# Patient Record
Sex: FEMALE | Race: WHITE | NOT HISPANIC OR LATINO | Employment: OTHER | URBAN - METROPOLITAN AREA
[De-identification: names, ages, dates, MRNs, and addresses within clinical notes are randomized per-mention and may not be internally consistent; named-entity substitution may affect disease eponyms.]

---

## 2021-07-29 ENCOUNTER — VBI (OUTPATIENT)
Dept: ADMINISTRATIVE | Facility: OTHER | Age: 61
End: 2021-07-29

## 2021-07-29 NOTE — TELEPHONE ENCOUNTER
Upon review of the In Basket request we were able to locate, review, and update the patient chart as requested for DEXA Scan, Immunization(s) Influenza, Mammogram and Pap Smear (HPV) aka Cervical Cancer Screening  Any additional questions or concerns should be emailed to the Practice Liaisons via [email or web address] email, please do not reply via In Basket      Thank you  Jarrod Gillis

## 2021-09-15 PROBLEM — Z80.3 FAMILY HISTORY OF MALIGNANT NEOPLASM OF BREAST: Status: ACTIVE | Noted: 2021-09-15

## 2021-09-16 RX ORDER — AMLODIPINE BESYLATE 5 MG/1
1 TABLET ORAL DAILY
COMMUNITY
Start: 2021-09-03

## 2021-09-16 RX ORDER — AMOXICILLIN 500 MG
1 CAPSULE ORAL EVERY 24 HOURS
COMMUNITY

## 2021-09-16 RX ORDER — SPIRONOLACTONE 50 MG/1
1 TABLET, FILM COATED ORAL DAILY
COMMUNITY
Start: 2021-09-02

## 2021-09-16 RX ORDER — ATORVASTATIN CALCIUM 10 MG/1
1 TABLET, FILM COATED ORAL DAILY
COMMUNITY
Start: 2021-09-14

## 2021-09-16 RX ORDER — ASPIRIN 81 MG/1
1 TABLET ORAL EVERY 24 HOURS
COMMUNITY
End: 2022-03-18 | Stop reason: ALTCHOICE

## 2021-09-16 RX ORDER — CYANOCOBALAMIN (VITAMIN B-12) 500 MCG
1 LOZENGE ORAL EVERY 24 HOURS
COMMUNITY

## 2021-09-16 RX ORDER — METOPROLOL SUCCINATE 25 MG/1
1 TABLET, EXTENDED RELEASE ORAL DAILY
COMMUNITY

## 2021-09-16 RX ORDER — APIXABAN 5 MG/1
1 TABLET, FILM COATED ORAL 2 TIMES DAILY
COMMUNITY
Start: 2021-09-02

## 2021-09-17 ENCOUNTER — OFFICE VISIT (OUTPATIENT)
Dept: OBGYN CLINIC | Facility: CLINIC | Age: 61
End: 2021-09-17
Payer: COMMERCIAL

## 2021-09-17 VITALS
SYSTOLIC BLOOD PRESSURE: 130 MMHG | DIASTOLIC BLOOD PRESSURE: 70 MMHG | HEIGHT: 64 IN | WEIGHT: 138 LBS | BODY MASS INDEX: 23.56 KG/M2

## 2021-09-17 DIAGNOSIS — Z80.3 FAMILY HISTORY OF MALIGNANT NEOPLASM OF BREAST: Primary | ICD-10-CM

## 2021-09-17 PROCEDURE — 99213 OFFICE O/P EST LOW 20 MIN: CPT | Performed by: OBSTETRICS & GYNECOLOGY

## 2021-09-17 NOTE — PROGRESS NOTES
Assessment/Plan:    Family history of malignant neoplasm of breast - Sister @ 62, negative genetics  Is being followed by neurology, now on Eliquis  No changes in SBE/CBE, mammogram normal 1/2021  Return to office six months for wellness       Diagnoses and all orders for this visit:    Family history of malignant neoplasm of breast - Sister @ 62, negative genetics    Other orders  -     aspirin (ECOTRIN LOW STRENGTH) 81 mg EC tablet; Take 1 tablet by mouth every 24 hours (Patient not taking: Reported on 9/17/2021)  -     spironolactone (ALDACTONE) 50 mg tablet; Take 1 tablet by mouth daily  -     atorvastatin (LIPITOR) 10 mg tablet; Take 1 tablet by mouth daily  -     amLODIPine (NORVASC) 5 mg tablet; Take 1 tablet by mouth daily  -     metoprolol succinate (TOPROL-XL) 25 mg 24 hr tablet; Take 1 tablet by mouth daily  -     Omega-3 Fatty Acids (Fish Oil) 1200 MG CAPS; Take 1 capsule by mouth every 24 hours  -     sertraline (ZOLOFT) 50 mg tablet; Take 1 tablet by mouth daily  -     SUPER B COMPLEX/C PO; Take 1 tablet by mouth daily  -     Cholecalciferol 25 MCG (1000 UT) CHEW; Chew 1 tablet every 24 hours  -     Multiple Vitamins-Minerals (One Daily Multivitamin Women) TABS; Take 1 tablet by mouth daily  -     Vitamin E 400 units TABS; Take 1 tablet by mouth every 24 hours  -     Eliquis 5 MG; Take 1 tablet by mouth 2 (two) times a day          Subjective:      Patient ID: Mary Win is a 59 y o  female  Here for six month breast check    The following portions of the patient's history were reviewed and updated as appropriate:   She  has a past medical history of Anxiety, Atrial fibrillation ([address] ), Colonic polyp, Hyperlipidemia, Hypertension, Papanicolaou smear (03/16/2021), Protein S deficiency ([facility] [address] ), and Right wrist fracture (2014)    She   Patient Active Problem List    Diagnosis Date Noted    Family history of malignant neoplasm of breast - Sister @ 62, negative genetics 09/15/2021     She  has a past surgical history that includes Cardiac electrophysiology study and ablation (2008); PILONIDAL CYST EXCISION (1979); Tonsillectomy; Mammo (historical) (Bilateral, 01/13/2021); DXA procedure(historical) (03/22/2018); and Colonoscopy (2017)  Her family history includes Breast cancer in her maternal aunt; Breast cancer (age of onset: 62) in her sister; No Known Problems in her maternal grandmother, mother, and sister; Protein S deficiency in her brother; Pulmonary embolism in her sister; Stroke in her brother  She  reports that she has never smoked  She has never used smokeless tobacco  She reports current alcohol use  She reports that she does not use drugs  Current Outpatient Medications   Medication Sig Dispense Refill    amLODIPine (NORVASC) 5 mg tablet Take 1 tablet by mouth daily      atorvastatin (LIPITOR) 10 mg tablet Take 1 tablet by mouth daily      Cholecalciferol 25 MCG (1000 UT) CHEW Chew 1 tablet every 24 hours      Eliquis 5 MG Take 1 tablet by mouth 2 (two) times a day      metoprolol succinate (TOPROL-XL) 25 mg 24 hr tablet Take 1 tablet by mouth daily      Multiple Vitamins-Minerals (One Daily Multivitamin Women) TABS Take 1 tablet by mouth daily      Omega-3 Fatty Acids (Fish Oil) 1200 MG CAPS Take 1 capsule by mouth every 24 hours      sertraline (ZOLOFT) 50 mg tablet Take 1 tablet by mouth daily      spironolactone (ALDACTONE) 50 mg tablet Take 1 tablet by mouth daily      SUPER B COMPLEX/C PO Take 1 tablet by mouth daily      Vitamin E 400 units TABS Take 1 tablet by mouth every 24 hours      aspirin (ECOTRIN LOW STRENGTH) 81 mg EC tablet Take 1 tablet by mouth every 24 hours (Patient not taking: Reported on 9/17/2021)       No current facility-administered medications for this visit  She is allergic to cefaclor       Review of Systems  No breast masses, nipple discharge or nipple bleeding      Objective:      /70 (BP Location: Left arm, Patient Position: Sitting, Cuff Size: Standard)   Ht 5' 4 25" (1 632 m)   Wt 62 6 kg (138 lb)   Breastfeeding No   BMI 23 50 kg/m²          Physical Exam  Neck: thyroid normal size without nodules, no palpable adenopathy  Breasts: no masses, nodes, skin changes  Abdomen: soft, non tender, non tender liver edge

## 2021-09-17 NOTE — ASSESSMENT & PLAN NOTE
Is being followed by neurology, now on Eliquis  No changes in SBE/CBE, mammogram normal 1/2021   Return to office six months for wellness

## 2021-09-20 ENCOUNTER — TRANSCRIBE ORDERS (OUTPATIENT)
Dept: SCHEDULING | Facility: REHABILITATION | Age: 61
End: 2021-09-20

## 2022-03-18 ENCOUNTER — ANNUAL EXAM (OUTPATIENT)
Dept: OBGYN CLINIC | Facility: CLINIC | Age: 62
End: 2022-03-18
Payer: COMMERCIAL

## 2022-03-18 VITALS
WEIGHT: 140 LBS | SYSTOLIC BLOOD PRESSURE: 148 MMHG | BODY MASS INDEX: 23.9 KG/M2 | DIASTOLIC BLOOD PRESSURE: 78 MMHG | HEIGHT: 64 IN

## 2022-03-18 DIAGNOSIS — Z12.31 ENCOUNTER FOR SCREENING MAMMOGRAM FOR MALIGNANT NEOPLASM OF BREAST: ICD-10-CM

## 2022-03-18 DIAGNOSIS — Z80.3 FAMILY HISTORY OF MALIGNANT NEOPLASM OF BREAST: ICD-10-CM

## 2022-03-18 DIAGNOSIS — Z01.419 ENCOUNTER FOR GYNECOLOGICAL EXAMINATION (GENERAL) (ROUTINE) WITHOUT ABNORMAL FINDINGS: Primary | ICD-10-CM

## 2022-03-18 DIAGNOSIS — Z80.3 FAMILY HISTORY OF BREAST CANCER: ICD-10-CM

## 2022-03-18 PROCEDURE — S0612 ANNUAL GYNECOLOGICAL EXAMINA: HCPCS | Performed by: OBSTETRICS & GYNECOLOGY

## 2022-03-18 NOTE — ASSESSMENT & PLAN NOTE
All well, no complaints  Daugher expecting 4th child next month  Normal breast and pelvic exams    Last pap 3/2021, due by 2026  Mammo order given  Dexa normal 2018  Colonoscopy 3/2022, repeat 3 years

## 2022-03-18 NOTE — PROGRESS NOTES
Assessment/Plan:    Encounter for gynecological examination (general) (routine) without abnormal findings  All well, no complaints  Daugher expecting 4th child next month  Normal breast and pelvic exams  Last pap 3/2021, due by 2026  Mammo order given  Dexa normal 2018  Colonoscopy 3/2022, repeat 3 years       Diagnoses and all orders for this visit:    Encounter for gynecological examination (general) (routine) without abnormal findings    Encounter for screening mammogram for malignant neoplasm of breast  -     Mammo screening bilateral w 3d & cad; Future    Family history of breast cancer  -     Mammo screening bilateral w 3d & cad; Future    Family history of malignant neoplasm of breast - Sister @ 62, negative genetics    Other orders  -     vitamin E 400 unit/15mL LIQD; every 24 hours          Subjective:      Patient ID: Avi Thornton is a 59 y o  female  Here for well check  The following portions of the patient's history were reviewed and updated as appropriate:   She  has a past medical history of Anxiety, Atrial fibrillation ([facility] [address] ), Colonic polyp, colonoscopy (03/14/2022), Hyperlipidemia, Hypertension, Papanicolaou smear (03/16/2021), Protein S deficiency ([facility] [address] ), and Right wrist fracture (2014)  She   Patient Active Problem List    Diagnosis Date Noted    Encounter for gynecological examination (general) (routine) without abnormal findings 03/18/2022    Family history of malignant neoplasm of breast - Sister @ 62, negative genetics 09/15/2021     She  has a past surgical history that includes Cardiac electrophysiology study and ablation (2008); PILONIDAL CYST EXCISION (1979); Tonsillectomy; Mammo (historical) (Bilateral, 01/13/2022); DXA procedure(historical) (03/22/2018); and Colonoscopy (03/2022)    Her family history includes Breast cancer in her maternal aunt; Breast cancer (age of onset: 62) in her sister; Deep vein thrombosis in her sister; Diabetes in her mother; Heart attack in her mother; Hypertension in her father and mother; No Known Problems in her maternal grandmother; Protein S deficiency in her brother; Pulmonary embolism in her sister; Stroke in her brother and father  She  reports that she has never smoked  She has never used smokeless tobacco  She reports current alcohol use  She reports that she does not use drugs  Current Outpatient Medications   Medication Sig Dispense Refill    amLODIPine (NORVASC) 5 mg tablet Take 1 tablet by mouth daily      atorvastatin (LIPITOR) 10 mg tablet Take 1 tablet by mouth daily      Cholecalciferol 25 MCG (1000 UT) CHEW Chew 1 tablet every 24 hours      Eliquis 5 MG Take 1 tablet by mouth 2 (two) times a day      metoprolol succinate (TOPROL-XL) 25 mg 24 hr tablet Take 1 tablet by mouth daily      Multiple Vitamins-Minerals (One Daily Multivitamin Women) TABS Take 1 tablet by mouth daily      Omega-3 Fatty Acids (Fish Oil) 1200 MG CAPS Take 1 capsule by mouth every 24 hours      sertraline (ZOLOFT) 50 mg tablet Take 1 tablet by mouth daily      spironolactone (ALDACTONE) 50 mg tablet Take 1 tablet by mouth daily      SUPER B COMPLEX/C PO Take 1 tablet by mouth daily      vitamin E 400 unit/15mL LIQD every 24 hours      Vitamin E 400 units TABS Take 1 tablet by mouth every 24 hours       No current facility-administered medications for this visit  She is allergic to cefaclor       Review of Systems  No PMB, breast, bladder, bowel changes   No new persistent pain, bloating, early satiety or pelvic pressure      Objective:      /78   Ht 5' 4 25" (1 632 m)   Wt 63 5 kg (140 lb)   Breastfeeding No   BMI 23 84 kg/m²          Physical Exam  General appearance: no distress, pleasant  Neck: thyroid without nodules or thyromegaly, no palpable adenopathy  Lymph nodes: no palpable adenopathy  Breasts: no masses, nodes or skin changes  Abdomen: soft, non tender, no palpable masses  Pelvic exam: normal atrophic external genitalia, urethral meatus normal, vagina atrophic without lesions, cervix atrophic without lesions, uterus small, non tender, no adnexal masses, non tender  Rectal exam: normal sphincter tone, no masses, RV confirms above

## 2022-03-18 NOTE — LETTER
March 18, 2022     [address]  [facility] [address]    Patient: Hailey Archuleta   YOB: 1960   Date of Visit: 3/18/2022       Dear Dr Tony Veras: Thank you for referring Aura Costello to me for evaluation  Below are my notes for this consultation  If you have questions, please do not hesitate to call me  I look forward to following your patient along with you  Sincerely,        Juan F Panda MD        CC: No Recipients  Juan F Panda MD  3/18/2022  1:27 PM  Sign when Signing Visit  Assessment/Plan:    Encounter for gynecological examination (general) (routine) without abnormal findings  All well, no complaints  Daugher expecting 4th child next month  Normal breast and pelvic exams  Last pap 3/2021, due by 2026  Mammo order given  Dexa normal 2018  Colonoscopy 3/2022, repeat 3 years       Diagnoses and all orders for this visit:    Encounter for gynecological examination (general) (routine) without abnormal findings    Encounter for screening mammogram for malignant neoplasm of breast  -     Mammo screening bilateral w 3d & cad; Future    Family history of breast cancer  -     Mammo screening bilateral w 3d & cad; Future    Family history of malignant neoplasm of breast - Sister @ 62, negative genetics    Other orders  -     vitamin E 400 unit/15mL LIQD; every 24 hours          Subjective:      Patient ID: Hailey Archuleta is a 59 y o  female  Here for well check  The following portions of the patient's history were reviewed and updated as appropriate:   She  has a past medical history of Anxiety, Atrial fibrillation ([address] ), Colonic polyp, colonoscopy (03/14/2022), Hyperlipidemia, Hypertension, Papanicolaou smear (03/16/2021), Protein S deficiency ([address] ), and Right wrist fracture (2014)    She   Patient Active Problem List    Diagnosis Date Noted    Encounter for gynecological examination (general) (routine) without abnormal findings 03/18/2022    Family history of malignant neoplasm of breast - Sister @ 62, negative genetics 09/15/2021     She  has a past surgical history that includes Cardiac electrophysiology study and ablation (2008); PILONIDAL CYST EXCISION (1979); Tonsillectomy; Mammo (historical) (Bilateral, 01/13/2022); DXA procedure(historical) (03/22/2018); and Colonoscopy (03/2022)  Her family history includes Breast cancer in her maternal aunt; Breast cancer (age of onset: 62) in her sister; Deep vein thrombosis in her sister; Diabetes in her mother; Heart attack in her mother; Hypertension in her father and mother; No Known Problems in her maternal grandmother; Protein S deficiency in her brother; Pulmonary embolism in her sister; Stroke in her brother and father  She  reports that she has never smoked  She has never used smokeless tobacco  She reports current alcohol use  She reports that she does not use drugs  Current Outpatient Medications   Medication Sig Dispense Refill    amLODIPine (NORVASC) 5 mg tablet Take 1 tablet by mouth daily      atorvastatin (LIPITOR) 10 mg tablet Take 1 tablet by mouth daily      Cholecalciferol 25 MCG (1000 UT) CHEW Chew 1 tablet every 24 hours      Eliquis 5 MG Take 1 tablet by mouth 2 (two) times a day      metoprolol succinate (TOPROL-XL) 25 mg 24 hr tablet Take 1 tablet by mouth daily      Multiple Vitamins-Minerals (One Daily Multivitamin Women) TABS Take 1 tablet by mouth daily      Omega-3 Fatty Acids (Fish Oil) 1200 MG CAPS Take 1 capsule by mouth every 24 hours      sertraline (ZOLOFT) 50 mg tablet Take 1 tablet by mouth daily      spironolactone (ALDACTONE) 50 mg tablet Take 1 tablet by mouth daily      SUPER B COMPLEX/C PO Take 1 tablet by mouth daily      vitamin E 400 unit/15mL LIQD every 24 hours      Vitamin E 400 units TABS Take 1 tablet by mouth every 24 hours       No current facility-administered medications for this visit       She is allergic to cefaclor       Review of Systems  No PMB, breast, bladder, bowel changes   No new persistent pain, bloating, early satiety or pelvic pressure      Objective:      /78   Ht 5' 4 25" (1 632 m)   Wt 63 5 kg (140 lb)   Breastfeeding No   BMI 23 84 kg/m²          Physical Exam  General appearance: no distress, pleasant  Neck: thyroid without nodules or thyromegaly, no palpable adenopathy  Lymph nodes: no palpable adenopathy  Breasts: no masses, nodes or skin changes  Abdomen: soft, non tender, no palpable masses  Pelvic exam: normal atrophic external genitalia, urethral meatus normal, vagina atrophic without lesions, cervix atrophic without lesions, uterus small, non tender, no adnexal masses, non tender  Rectal exam: normal sphincter tone, no masses, RV confirms above

## 2022-04-04 ENCOUNTER — TELEPHONE (OUTPATIENT)
Dept: OBGYN CLINIC | Facility: CLINIC | Age: 62
End: 2022-04-04

## 2022-04-04 DIAGNOSIS — Z79.899 ON SSRI THERAPY: Primary | ICD-10-CM

## 2022-04-04 NOTE — TELEPHONE ENCOUNTER
[facility] pharmacy contacted office to obtain Sertraline 50 mg renewal for 90 days  Sent message to Dr Cody Randolph

## 2023-02-13 DIAGNOSIS — Z12.31 ENCOUNTER FOR SCREENING MAMMOGRAM FOR MALIGNANT NEOPLASM OF BREAST: ICD-10-CM

## 2023-02-13 DIAGNOSIS — Z80.3 FAMILY HISTORY OF BREAST CANCER: ICD-10-CM

## 2023-03-20 PROBLEM — G20.C PRIMARY PARKINSONISM: Status: ACTIVE | Noted: 2022-06-02

## 2023-03-20 PROBLEM — G31.85 CORTICOBASAL SYNDROME (HCC): Status: ACTIVE | Noted: 2023-02-13

## 2023-03-20 PROBLEM — G20 PRIMARY PARKINSONISM (HCC): Status: ACTIVE | Noted: 2022-06-02

## 2023-03-20 NOTE — PROGRESS NOTES
Assessment/Plan:    Encounter for gynecological examination (general) (routine) without abnormal findings  Here for well check  Has been diagnosed with Parkinsons, speech has declined  No breast or gyn complaints  Normal breast and pelvic exams  Last pap 3/2021 neg/HPV neg; due 2026  Mammo order given, last 1/18/23  Colonoscopy 3/2022, due 2025  Dexa 3/2018 normal    Primary parkinsonism ([address] )  Quite unstable while standing  Kwame Campos when standing to get dressed  Denies hitting head  Daughter called to  pt  Discussed recommendations for head CT after fall with head injury (2 days ago per pt)  Diagnoses and all orders for this visit:    Family history of breast cancer in sister  -     Mammo screening bilateral w 3d & cad; Future    Breast cancer screening by mammogram  -     Mammo screening bilateral w 3d & cad; Future    Encounter for gynecological examination (general) (routine) without abnormal findings    Other orders  -     sertraline (ZOLOFT) 100 mg tablet; Take 100 mg by mouth daily          Subjective:      Patient ID: Marianna Mcmahan is a 57 y o  female  HPI Here for well check  The following portions of the patient's history were reviewed and updated as appropriate:   She  has a past medical history of Anxiety, Atrial fibrillation ([address] ), Colonic polyp, colonoscopy (03/14/2022), Hyperlipidemia, Hypertension, Papanicolaou smear (03/16/2021), Protein S deficiency ([address] ), Right wrist fracture (2014), and Varicella (when I was a kid)  She   Patient Active Problem List    Diagnosis Date Noted   • Corticobasal syndrome ([address] ) 02/13/2023   • Primary parkinsonism ([address] ) 06/02/2022   • Encounter for gynecological examination (general) (routine) without abnormal findings 03/18/2022   • Family history of malignant neoplasm of breast - Sister @ 62, negative genetics 09/15/2021     She  has a past surgical history that includes Cardiac electrophysiology study and ablation (2008);  PILONIDAL CYST EXCISION (1979); Tonsillectomy; Mammo (historical) (Bilateral, 01/13/2022); DXA procedure(historical) (03/22/2018); and Colonoscopy (03/2022)  Her family history includes Breast cancer in her maternal aunt and sister; Colon cancer in her sister; Deep vein thrombosis in her sister; Diabetes in her mother and paternal grandmother; Heart attack in her mother and paternal grandfather; Hypertension in her father and mother; No Known Problems in her daughter, daughter, maternal grandfather, maternal grandmother, and son; Protein S deficiency in her brother; Pulmonary embolism in her sister; Stroke in her brother and father  She  reports that she has never smoked  She has never used smokeless tobacco  She reports current alcohol use  She reports that she does not use drugs  Current Outpatient Medications   Medication Sig Dispense Refill   • amLODIPine (NORVASC) 5 mg tablet Take 1 tablet by mouth daily     • atorvastatin (LIPITOR) 10 mg tablet Take 1 tablet by mouth daily     • Cholecalciferol 25 MCG (1000 UT) CHEW Chew 1 tablet every 24 hours     • Eliquis 5 MG Take 1 tablet by mouth 2 (two) times a day     • metoprolol succinate (TOPROL-XL) 25 mg 24 hr tablet Take 1 tablet by mouth 2 (two) times a day     • Multiple Vitamins-Minerals (One Daily Multivitamin Women) TABS Take 1 tablet by mouth daily     • Omega-3 Fatty Acids (Fish Oil) 1200 MG CAPS Take 1 capsule by mouth every 24 hours     • sertraline (ZOLOFT) 100 mg tablet Take 100 mg by mouth daily     • spironolactone (ALDACTONE) 25 mg tablet Take 1 tablet by mouth daily     • SUPER B COMPLEX/C PO Take 1 tablet by mouth daily     • Vitamin E 400 units TABS Take 1 tablet by mouth every 24 hours       No current facility-administered medications for this visit  She is allergic to cefaclor       Review of Systems  No PMB, breast, bladder, bowel changes   No new persistent pain, bloating, early satiety or pelvic pressure      Objective:      /62 (BP Location: Left arm, Patient Position: Sitting, Cuff Size: Standard)   Ht 5' 4 25" (1 632 m)   Wt 57 9 kg (127 lb 9 6 oz)   BMI 21 73 kg/m²          Physical Exam    General appearance: speech stilted, unsteady on feet  Neck: thyroid without nodules or thyromegaly, no palpable adenopathy  Skin: bruising noted, healing forehead laceration  Lymph nodes: no palpable adenopathy  Breasts: no masses, nodes or skin changes  Abdomen: soft, non tender, no palpable masses  Pelvic exam: normal atrophic external genitalia, urethral meatus normal, vagina atrophic without lesions, cervix atrophic without lesions, uterus small, non tender, no adnexal masses, non tender  Rectal exam: normal sphincter tone, no masses, RV confirms above

## 2023-03-21 ENCOUNTER — ANNUAL EXAM (OUTPATIENT)
Dept: OBGYN CLINIC | Facility: CLINIC | Age: 63
End: 2023-03-21

## 2023-03-21 VITALS
BODY MASS INDEX: 21.78 KG/M2 | SYSTOLIC BLOOD PRESSURE: 122 MMHG | WEIGHT: 127.6 LBS | DIASTOLIC BLOOD PRESSURE: 62 MMHG | HEIGHT: 64 IN

## 2023-03-21 DIAGNOSIS — Z12.31 BREAST CANCER SCREENING BY MAMMOGRAM: ICD-10-CM

## 2023-03-21 DIAGNOSIS — Z01.419 ENCOUNTER FOR GYNECOLOGICAL EXAMINATION (GENERAL) (ROUTINE) WITHOUT ABNORMAL FINDINGS: Primary | ICD-10-CM

## 2023-03-21 DIAGNOSIS — Z80.3 FAMILY HISTORY OF BREAST CANCER IN SISTER: ICD-10-CM

## 2023-03-21 RX ORDER — SERTRALINE HYDROCHLORIDE 100 MG/1
100 TABLET, FILM COATED ORAL DAILY
COMMUNITY
Start: 2023-03-02

## 2023-03-21 NOTE — PATIENT INSTRUCTIONS
It is recommended to have a cat scan of your head after a fall!!!!!    You should not be driving! Have Priscilla Torres show you how to use [employer] on your phone  Return to office in one year unless having any problems  Call in six months to schedule your annual visit

## 2023-03-21 NOTE — ASSESSMENT & PLAN NOTE
Here for well check  Has been diagnosed with Parkinsons, speech has declined  No breast or gyn complaints  Normal breast and pelvic exams    Last pap 3/2021 neg/HPV neg; due 2026  Mammo order given, last 1/18/23  Colonoscopy 3/2022, due 2025  Dexa 3/2018 normal

## 2023-03-21 NOTE — LETTER
March 21, 2023     [address]  [facility] [address]    Patient: Tanja Ramos   YOB: 1960   Date of Visit: 3/21/2023       Dear Dr Sharla Abdullahi: Thank you for referring Mary Ann Romero to me for evaluation  Below are my notes for this consultation  If you have questions, please do not hesitate to call me  I look forward to following your patient along with you  Sincerely,        Kingsley Thomas MD        CC: MD Kingsley Hammer MD  3/21/2023  9:25 AM  Sign when Signing Visit  Assessment/Plan:    Encounter for gynecological examination (general) (routine) without abnormal findings  Here for well check  Has been diagnosed with Parkinsons, speech has declined  No breast or gyn complaints  Normal breast and pelvic exams  Last pap 3/2021 neg/HPV neg; due 2026  Mammo order given, last 1/18/23  Colonoscopy 3/2022, due 2025  Dexa 3/2018 normal    Primary parkinsonism ([address] )  Quite unstable while standing  Basilio Gage when standing to get dressed  Denies hitting head  Daughter called to  pt  Discussed recommendations for head CT after fall with head injury (2 days ago per pt)  Diagnoses and all orders for this visit:    Family history of breast cancer in sister  -     Mammo screening bilateral w 3d & cad; Future    Breast cancer screening by mammogram  -     Mammo screening bilateral w 3d & cad; Future    Encounter for gynecological examination (general) (routine) without abnormal findings    Other orders  -     sertraline (ZOLOFT) 100 mg tablet; Take 100 mg by mouth daily         Subjective:     Patient ID: Tanja Ramos is a 57 y o  female  HPI Here for well check      The following portions of the patient's history were reviewed and updated as appropriate:   She  has a past medical history of Anxiety, Atrial fibrillation ([address] ), Colonic polyp, colonoscopy (03/14/2022), Hyperlipidemia, Hypertension, Papanicolaou smear (03/16/2021), Protein S deficiency ([facility] ), Right wrist fracture (2014), and Varicella (when I was a kid)  She   Patient Active Problem List    Diagnosis Date Noted   • Corticobasal syndrome ([facility] ) 02/13/2023   • Primary parkinsonism ([facility] ) 06/02/2022   • Encounter for gynecological examination (general) (routine) without abnormal findings 03/18/2022   • Family history of malignant neoplasm of breast - Sister @ 62, negative genetics 09/15/2021     She  has a past surgical history that includes Cardiac electrophysiology study and ablation (2008); PILONIDAL CYST EXCISION (1979); Tonsillectomy; Mammo (historical) (Bilateral, 01/13/2022); DXA procedure(historical) (03/22/2018); and Colonoscopy (03/2022)  Her family history includes Breast cancer in her maternal aunt and sister; Colon cancer in her sister; Deep vein thrombosis in her sister; Diabetes in her mother and paternal grandmother; Heart attack in her mother and paternal grandfather; Hypertension in her father and mother; No Known Problems in her daughter, daughter, maternal grandfather, maternal grandmother, and son; Protein S deficiency in her brother; Pulmonary embolism in her sister; Stroke in her brother and father  She  reports that she has never smoked  She has never used smokeless tobacco  She reports current alcohol use  She reports that she does not use drugs    Current Outpatient Medications   Medication Sig Dispense Refill   • amLODIPine (NORVASC) 5 mg tablet Take 1 tablet by mouth daily     • atorvastatin (LIPITOR) 10 mg tablet Take 1 tablet by mouth daily     • Cholecalciferol 25 MCG (1000 UT) CHEW Chew 1 tablet every 24 hours     • Eliquis 5 MG Take 1 tablet by mouth 2 (two) times a day     • metoprolol succinate (TOPROL-XL) 25 mg 24 hr tablet Take 1 tablet by mouth 2 (two) times a day     • Multiple Vitamins-Minerals (One Daily Multivitamin Women) TABS Take 1 tablet by mouth daily     • Omega-3 Fatty Acids (Fish Oil) 1200 MG CAPS Take 1 capsule by mouth every 24 hours     • sertraline (ZOLOFT) 100 mg tablet Take 100 mg by mouth daily     • spironolactone (ALDACTONE) 25 mg tablet Take 1 tablet by mouth daily     • SUPER B COMPLEX/C PO Take 1 tablet by mouth daily     • Vitamin E 400 units TABS Take 1 tablet by mouth every 24 hours       No current facility-administered medications for this visit  She is allergic to cefaclor       Review of Systems No PMB, breast, bladder, bowel changes   No new persistent pain, bloating, early satiety or pelvic pressure      Objective:      /62 (BP Location: Left arm, Patient Position: Sitting, Cuff Size: Standard)   Ht 5' 4 25" (1 632 m)   Wt 57 9 kg (127 lb 9 6 oz)   BMI 21 73 kg/m²         Physical Exam   General appearance: speech stilted, unsteady on feet  Neck: thyroid without nodules or thyromegaly, no palpable adenopathy  Skin: bruising noted, healing forehead laceration  Lymph nodes: no palpable adenopathy  Breasts: no masses, nodes or skin changes  Abdomen: soft, non tender, no palpable masses  Pelvic exam: normal atrophic external genitalia, urethral meatus normal, vagina atrophic without lesions, cervix atrophic without lesions, uterus small, non tender, no adnexal masses, non tender  Rectal exam: normal sphincter tone, no masses, RV confirms above

## 2023-03-21 NOTE — ASSESSMENT & PLAN NOTE
Quite unstable while standing  [place] Anitha when standing to get dressed  Denies hitting head  Daughter called to  pt  Discussed recommendations for head CT after fall with head injury (2 days ago per pt)

## 2024-02-21 PROBLEM — Z01.419 ENCOUNTER FOR GYNECOLOGICAL EXAMINATION (GENERAL) (ROUTINE) WITHOUT ABNORMAL FINDINGS: Status: RESOLVED | Noted: 2022-03-18 | Resolved: 2024-02-21
